# Patient Record
Sex: FEMALE | Race: WHITE | NOT HISPANIC OR LATINO | Employment: FULL TIME | URBAN - METROPOLITAN AREA
[De-identification: names, ages, dates, MRNs, and addresses within clinical notes are randomized per-mention and may not be internally consistent; named-entity substitution may affect disease eponyms.]

---
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
## 2023-10-25 ENCOUNTER — HOSPITAL ENCOUNTER (EMERGENCY)
Facility: CLINIC | Age: 23
Discharge: HOME | End: 2023-10-25
Payer: OTHER MISCELLANEOUS

## 2023-10-25 ENCOUNTER — APPOINTMENT (OUTPATIENT)
Dept: GENERAL RADIOLOGY | Facility: CLINIC | Age: 23
End: 2023-10-25
Payer: OTHER MISCELLANEOUS

## 2023-10-25 VITALS
RESPIRATION RATE: 16 BRPM | HEIGHT: 63 IN | OXYGEN SATURATION: 100 % | TEMPERATURE: 98.1 F | HEART RATE: 80 BPM | SYSTOLIC BLOOD PRESSURE: 127 MMHG | DIASTOLIC BLOOD PRESSURE: 83 MMHG

## 2023-10-25 DIAGNOSIS — S62.650A CLOSED NONDISPLACED FRACTURE OF MIDDLE PHALANX OF RIGHT INDEX FINGER, INITIAL ENCOUNTER: ICD-10-CM

## 2023-10-25 PROCEDURE — 26720 TREAT FINGER FRACTURE EACH: CPT | Mod: 54

## 2023-10-25 PROCEDURE — G0463 HOSPITAL OUTPT CLINIC VISIT: HCPCS | Mod: 25

## 2023-10-25 PROCEDURE — 26720 TREAT FINGER FRACTURE EACH: CPT | Mod: F6

## 2023-10-25 PROCEDURE — 99203 OFFICE O/P NEW LOW 30 MIN: CPT | Mod: 57

## 2023-10-25 PROCEDURE — 73140 X-RAY EXAM OF FINGER(S): CPT | Mod: RT

## 2023-10-25 NOTE — DISCHARGE INSTRUCTIONS
Finger splint for immobilization.  Tylenol and ibuprofen as needed for pain.  Ice for swelling.  Referral was placed for orthopedics.  Please follow-up with them in about a week.  Return in the meantime for any new concerns.

## 2023-10-25 NOTE — LETTER
October 25, 2023      To Whom It May Concern:      Trice Nguyen was seen in our urgent care today, 10/25/23.  She will be required to wear a finger splint.  Please provide her with appropriate accommodations considering this.  She may receive further recommendations after follow-up with orthopedics.    Sincerely,        JUNIOR Escudero CNP

## 2023-10-26 NOTE — ED PROVIDER NOTES
"  History   No chief complaint on file.    HPI  Trice Nguyen is a 23 year old female who presents to urgent care for concern of finger injury patient states that she was going to block a soccer ball with her hand and in the process manage to run into a cement wall causing her to jam her right index finger.  Injury occurred a few hours prior to arrival.  Patient reports decreased range of motion in the affected index finger.  Pain located mostly at the PIP joint and the finger.  Has tried ice and ibuprofen without any significant relief in her symptoms.  No pain in the hand or other fingers of the affected hand.  No additional concerns at this time.    Allergies:  Allergies   Allergen Reactions    Amoxicillin     Neosporin [Neomycin-Polymyxin-Gramicidin]        Problem List:    There are no problems to display for this patient.       Past Medical History:    No past medical history on file.    Past Surgical History:    No past surgical history on file.    Family History:    No family history on file.    Social History:  Marital Status:  Single [1]        Medications:    APRI 0.15-30 MG-MCG tablet  escitalopram (LEXAPRO) 10 MG tablet          Review of Systems   All other systems reviewed and are negative.    See HPI  Physical Exam   BP: 127/83  Pulse: 80  Temp: 98.1  F (36.7  C)  Resp: 16  Height: 160 cm (5' 3\")  SpO2: 100 %      Physical Exam  Constitutional:       General: She is not in acute distress.     Appearance: Normal appearance. She is well-developed.   HENT:      Head: Normocephalic and atraumatic.   Eyes:      General: No scleral icterus.     Conjunctiva/sclera: Conjunctivae normal.   Cardiovascular:      Rate and Rhythm: Normal rate.   Pulmonary:      Effort: Pulmonary effort is normal. No respiratory distress.   Abdominal:      General: Abdomen is flat.   Musculoskeletal:      Right wrist: Normal.      Right hand: Swelling (Mild swelling of the right second finger.) and bony tenderness (PIP joint " of the second finger) present. Decreased range of motion. Decreased strength of finger abduction and thumb/finger opposition. Normal sensation. Normal capillary refill. Normal pulse.      Cervical back: Normal range of motion and neck supple.   Skin:     General: Skin is warm and dry.      Findings: No rash.   Neurological:      Mental Status: She is alert and oriented to person, place, and time.         ED Course                 Procedures           Results for orders placed or performed during the hospital encounter of 10/25/23 (from the past 24 hour(s))   XR Finger Right G/E 2 Views    Narrative    EXAM: XR FINGER RIGHT G/E 2 VIEWS  LOCATION: [facility]  DATE: 10/25/2023    INDICATION: Finger pain after jamming injury.  COMPARISON: None.      Impression    IMPRESSION: There is a nondisplaced fracture of the volar aspect of the base of the middle phalanx of the finger seen on the lateral view suggestive of an avulsive injury to the tendinous and/or capsular attachments at the base of the middle phalanx. No   dislocation. Exam otherwise negative.         Medications - No data to display    Assessments & Plan (with Medical Decision Making)   Presented to urgent care for concern of finger injury.  She is afebrile on arrival vital signs otherwise reassuring.  X-rays were completed today of the affected finger which shows a nondisplaced fracture of the second finger at the base of the middle phalanx.  CMS remains intact and patient has normal capillary refill.  Finger was splinted in the department today.  Symptomatic pain relief discussed.  Return precautions reviewed.  Patient was discharged in good condition and is agreeable to above plan.  Referral for orthopedics was placed for follow-up in about 1 week.  Work note and work limitations provided.    I have reviewed the nursing notes.    I have reviewed the findings, diagnosis, plan and need for follow up with the  patient.        Discharge Medication List as of 10/25/2023  6:48 PM          Final diagnoses:   Closed nondisplaced fracture of middle phalanx of right index finger, initial encounter       10/25/2023   [facility] EMERGENCY DEPT      Disclaimer:  This note consists of symbols derived from keyboarding, dictation and/or voice recognition software.  As a result, there may be errors in the script that have gone undetected.  Please consider this when interpreting information found in this chart.       Abdiel Valerio APRN CNP  10/25/23 2031

## 2023-11-08 ENCOUNTER — ANCILLARY PROCEDURE (OUTPATIENT)
Dept: GENERAL RADIOLOGY | Facility: CLINIC | Age: 23
End: 2023-11-08
Attending: PEDIATRICS
Payer: COMMERCIAL

## 2023-11-08 ENCOUNTER — OFFICE VISIT (OUTPATIENT)
Dept: ORTHOPEDICS | Facility: CLINIC | Age: 23
End: 2023-11-08
Payer: OTHER MISCELLANEOUS

## 2023-11-08 VITALS — HEIGHT: 63 IN | BODY MASS INDEX: 28.53 KG/M2 | WEIGHT: 161 LBS

## 2023-11-08 DIAGNOSIS — S63.639A VOLAR PLATE INJURY OF FINGER, INITIAL ENCOUNTER: ICD-10-CM

## 2023-11-08 PROBLEM — S62.650A CLOSED NONDISPLACED FRACTURE OF MIDDLE PHALANX OF RIGHT INDEX FINGER, INITIAL ENCOUNTER: Status: ACTIVE | Noted: 2023-11-08

## 2023-11-08 PROCEDURE — 73140 X-RAY EXAM OF FINGER(S): CPT | Mod: TC | Performed by: RADIOLOGY

## 2023-11-08 PROCEDURE — 99204 OFFICE O/P NEW MOD 45 MIN: CPT | Performed by: PEDIATRICS

## 2023-11-08 NOTE — LETTER
11/8/2023         RE: Trice Nguyen  [address] [place] [address]        Dear Colleague,    Thank you for referring your patient, Trice Nguyen, to the [facility] [place]. Please see a copy of my visit note below.    ASSESSMENT & PLAN    Trice was seen today for fracture.    Diagnoses and all orders for this visit:    Volar plate injury of finger, initial encounter  -     Orthopedic  Referral  -     Cancel: XR Finger Right G/E 2 Views; Future  -     XR Finger Right G/E 2 Views      This issue is acute and Unchanged.      ICD-10-CM    1. Volar plate injury of finger, initial encounter  S63.639A Orthopedic  Referral     XR Finger Right G/E 2 Views     CANCELED: XR Finger Right G/E 2 Views        Patient Instructions   We discussed these other possible diagnosis: Volar plate fracture, can transition to buddy taping    Plan:  - Today's Plan of Care:  Transition to buddy taping for support  Work Letter given    Discussed activity considerations and other supportive care including Ice/Heat (water), OTC medications as needed.    After 1- 2 weeks, start to remove taping and work on range of motion    -We also discussed other future treatment options:  Referral to Hand Occupational Therapy    Follow Up: 3 weeks, repeat x-rays    Concerning signs and symptoms were reviewed and all questions were answered at this time.    Lacey Alicea MD [facility]  Sports Medicine Physician  [facility]    -----  Chief Complaint   Patient presents with     Right Index Finger - Fracture       SUBJECTIVE  Trice Nguyen is a/an 23 year old female who is seen in consultation at the request of  Abdiel Valerio C.N.P. for evaluation of R middle finger.     The patient is seen by themselves.  The patient is Right handed    Onset: 2 week(s) ago. 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  CV: distal perfusion intact  Resp: normal respiratory effort without conversational dyspnea   Psych: normal mood and affect  Gait: NORMAL  Neuro: Normal light sensory exam of upper extremity    Bilateral Wrist and Hand exam    Inspection:       Swelling: throughout left index finger    Tender:       MCP joint of index finger digit(s)  left    Non Tender:       Remainder of the Wrist and Hand bilateral    ROM:       Decreased ROM left index finger MCP joint, able to flex and extend at every joint    Strength:       Decreased strength left MCP joint, otherwise 5/5 strength in the muscles of the hand, wrist and forearm bilateral    Neurovascular:       2+ radial pulses bilaterally with brisk capillary refill and      normal sensation to light touch in the radial, median and ulnar nerve distributions    RADIOLOGY:  Final results and radiologist's interpretation, available in the [facility] health record.  Images were reviewed with the patient in the office today.  My personal interpretation of the performed imaging:   3 XR views of left index finger reviewed and compared to ER XR 10/25/2023: volar plate chip fracture, no significant degenerative change  - will follow official read    Reviewed ED note  Review of the result(s) of each unique test - XR             Again, thank you for " allowing me to participate in the care of your patient.        Sincerely,        Lacey Alicea MD

## 2023-11-08 NOTE — PATIENT INSTRUCTIONS
We discussed these other possible diagnosis: Volar plate fracture, can transition to buddy taping    Plan:  - Today's Plan of Care:  Transition to buddy taping for support  Work Letter given    Discussed activity considerations and other supportive care including Ice/Heat (water), OTC medications as needed.    After 1- 2 weeks, start to remove taping and work on range of motion    -We also discussed other future treatment options:  Referral to Hand Occupational Therapy    Follow Up: 3 weeks, repeat x-rays    If you have any further questions for your physician or physician s care team you can call [phone] and use option 3 to leave a voice message.

## 2023-11-08 NOTE — PROGRESS NOTES
ASSESSMENT & PLAN    Trice was seen today for fracture.    Diagnoses and all orders for this visit:    Volar plate injury of finger, initial encounter  -     Orthopedic  Referral  -     Cancel: XR Finger Right G/E 2 Views; Future  -     XR Finger Right G/E 2 Views      This issue is acute and Unchanged.      ICD-10-CM    1. Volar plate injury of finger, initial encounter  S63.639A Orthopedic  Referral     XR Finger Right G/E 2 Views     CANCELED: XR Finger Right G/E 2 Views        Patient Instructions   We discussed these other possible diagnosis: Volar plate fracture, can transition to buddy taping    Plan:  - Today's Plan of Care:  Transition to buddy taping for support  Work Letter given    Discussed activity considerations and other supportive care including Ice/Heat (water), OTC medications as needed.    After 1- 2 weeks, start to remove taping and work on range of motion    -We also discussed other future treatment options:  Referral to Hand Occupational Therapy    Follow Up: 3 weeks, repeat x-rays    Concerning signs and symptoms were reviewed and all questions were answered at this time.    Lacey Alicea MD [facility]  Sports Medicine Physician  [facility]    -----  Chief Complaint   Patient presents with    Right Index Finger - Fracture       SUBJECTIVE  Trice Nguyen is a/an 23 year old female who is seen in consultation at the request of  Abdiel Valerio C.N.P. for evaluation of R middle finger.     The patient is seen by themselves.  The patient is Right handed    Onset: 2 week(s) ago. 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  CV: distal perfusion intact  Resp: normal respiratory effort without conversational dyspnea   Psych: normal mood and affect  Gait: NORMAL  Neuro: Normal light sensory exam of upper extremity    Bilateral Wrist and Hand exam    Inspection:       Swelling: throughout left index finger    Tender:       MCP joint of index finger digit(s)  left    Non Tender:       Remainder of the Wrist and Hand bilateral    ROM:       Decreased ROM left index finger MCP joint, able to flex and extend at every joint    Strength:       Decreased strength left MCP joint, otherwise 5/5 strength in the muscles of the hand, wrist and forearm bilateral    Neurovascular:       2+ radial pulses bilaterally with brisk capillary refill and      normal sensation to light touch in the radial, median and ulnar nerve distributions    RADIOLOGY:  Final results and radiologist's interpretation, available in the [facility] health record.  Images were reviewed with the patient in the office today.  My personal interpretation of the performed imaging:   3 XR views of left index finger reviewed and compared to ER XR 10/25/2023: volar plate chip fracture, no significant degenerative change  - will follow official read    Reviewed ED note  Review of the result(s) of each unique test - XR         "

## 2023-11-08 NOTE — LETTER
November 8, 2023      Trice Coughlin  [address] [place] [address]        To Whom It May Concern,     Trice was seen for a right finger fracture.  She can work with the following restrictions:  - devonte tape for support  - provide accommodations for limited use of the right hand  - Limit lifting to < 20 lbs    Follow up will be in 3 weeks    Sincerely,        Lacey Alicea MD

## 2023-11-29 ENCOUNTER — ANCILLARY PROCEDURE (OUTPATIENT)
Dept: GENERAL RADIOLOGY | Facility: CLINIC | Age: 23
End: 2023-11-29
Attending: PEDIATRICS
Payer: COMMERCIAL

## 2023-11-29 ENCOUNTER — OFFICE VISIT (OUTPATIENT)
Dept: ORTHOPEDICS | Facility: CLINIC | Age: 23
End: 2023-11-29
Payer: OTHER MISCELLANEOUS

## 2023-11-29 VITALS — WEIGHT: 161 LBS | BODY MASS INDEX: 28.52 KG/M2

## 2023-11-29 DIAGNOSIS — S63.639D VOLAR PLATE INJURY OF FINGER, SUBSEQUENT ENCOUNTER: Primary | ICD-10-CM

## 2023-11-29 DIAGNOSIS — S63.639D VOLAR PLATE INJURY OF FINGER, SUBSEQUENT ENCOUNTER: ICD-10-CM

## 2023-11-29 PROCEDURE — 73140 X-RAY EXAM OF FINGER(S): CPT | Mod: TC | Performed by: RADIOLOGY

## 2023-11-29 PROCEDURE — 99213 OFFICE O/P EST LOW 20 MIN: CPT | Performed by: PEDIATRICS

## 2023-11-29 NOTE — PATIENT INSTRUCTIONS
We discussed these other possible diagnosis: Healing fracture    Plan:  - Today's Plan of Care:  Buddy tape with support as needed - heavy activities, lifting, sports  Gradual return to activities as needed    Work Letter given    -We also discussed other future treatment options:  Referral to Occupational Hand Therapy    Follow Up: 1 month if needed    If you have any further questions for your physician or physician s care team you can call [phone] and use option 3 to leave a voice message.

## 2023-11-29 NOTE — LETTER
11/29/2023         RE: Trice Nguyen  [address] [place] [address]        Dear Colleague,    Thank you for referring your patient, Trice Nguyen, to the [facility] [place]. Please see a copy of my visit note below.    ASSESSMENT & PLAN    Diagnoses and all orders for this visit:    Volar plate injury of finger, subsequent encounter  -     XR Finger Right G/E 2 Views; Future      This issue is acute and Improving.      ICD-10-CM    1. Volar plate injury of finger, subsequent encounter  S63.639D XR Finger Right G/E 2 Views        Patient Instructions   We discussed these other possible diagnosis: Healing fracture    Plan:  - Today's Plan of Care:  Buddy tape with support as needed - heavy activities, lifting, sports  Gradual return to activities as needed    Work Letter given    -We also discussed other future treatment options:  Referral to Occupational Hand Therapy    Follow Up: 1 month if needed    Concerning signs and symptoms were reviewed and all questions were answered at this time.    Lacey Alicea MD [facility]  Sports Medicine Physician  [facility]    SUBJECTIVE- Interim History November 29, 2023    No chief complaint on file.      Trice Nguyen is a 23 year old female who is seen in f/u up for Volar plate injury of finger, subsequent encounter. Since last visit on 11/8/23 , patient has been feeling pretty good. She is able to make a fist.  She states twisting sometimes hurts, but feels mostly normal. Has been buddy taping at work for support.  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  CV: distal perfusion intact  Resp: normal respiratory effort without conversational dyspnea   Psych: normal mood and affect  Gait: NORMAL  Neuro: Normal light sensory exam of upper extremity    Bilateral Wrist and Hand exam  Inspection:       No swelling, bruising or deformity bilateral    Tender:       none    Non Tender:       Remainder of the Wrist and Hand bilateral    ROM:       Full and symmetric active and passive range of motion of the forearm, wrist and digits bilateral    Strength:       5/5 strength in the muscles of the hand, wrist and forearm bilateral    Neurovascular:       2+ radial pulses bilaterally with brisk capillary refill and      normal sensation to light touch in the radial, median and ulnar nerve distributions    RADIOLOGY:  Final results and radiologist's interpretation, available in the [facility] health record.  Images were reviewed with the patient in the office today.  My personal interpretation of the performed imaging:     3 XR views of right index finger reviewed: healing volar plate fracture, no significant degenerative change  - will follow official read    Review of the result(s) of each unique test - XR             Again, thank you for allowing me to participate in the care of your patient.        Sincerely,        Lacey Alicea MD

## 2023-11-29 NOTE — PROGRESS NOTES
ASSESSMENT & PLAN    Diagnoses and all orders for this visit:    Volar plate injury of finger, subsequent encounter  -     XR Finger Right G/E 2 Views; Future      This issue is acute and Improving.      ICD-10-CM    1. Volar plate injury of finger, subsequent encounter  S63.639D XR Finger Right G/E 2 Views        Patient Instructions   We discussed these other possible diagnosis: Healing fracture    Plan:  - Today's Plan of Care:  Buddy tape with support as needed - heavy activities, lifting, sports  Gradual return to activities as needed    Work Letter given    -We also discussed other future treatment options:  Referral to Occupational Hand Therapy    Follow Up: 1 month if needed    Concerning signs and symptoms were reviewed and all questions were answered at this time.    Lacey Alicea MD [facility]  Sports Medicine Physician  [facility]    SUBJECTIVE- Interim History November 29, 2023    No chief complaint on file.      Trice Nguyen is a 23 year old female who is seen in f/u up for Volar plate injury of finger, subsequent encounter. Since last visit on 11/8/23 , patient has been feeling pretty good. She is able to make a fist.  She states twisting sometimes hurts, but feels mostly normal. Has been buddy taping at work for support.  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  CV: distal perfusion intact  Resp: normal respiratory effort without conversational dyspnea   Psych: normal mood and affect  Gait: NORMAL  Neuro: Normal light sensory exam of upper extremity    Bilateral Wrist and Hand exam  Inspection:       No swelling, bruising or deformity bilateral    Tender:       none    Non Tender:       Remainder of the Wrist and Hand bilateral    ROM:       Full and symmetric active and passive range of motion of the forearm, wrist and digits bilateral    Strength:       5/5 strength in the muscles of the hand, wrist and forearm bilateral    Neurovascular:       2+ radial pulses bilaterally with brisk capillary refill and      normal sensation to light touch in the radial, median and ulnar nerve distributions    RADIOLOGY:  Final results and radiologist's interpretation, available in the [facility] health record.  Images were reviewed with the patient in the office today.  My personal interpretation of the performed imaging:     3 XR views of right index finger reviewed: healing volar plate fracture, no significant degenerative change  - will follow official read    Review of the result(s) of each unique test - XR

## 2023-11-29 NOTE — LETTER
November 29, 2023      Trice Coughlin  [address] [place] [address]        To Whom It May Concern,      Trice was seen for a right finger fracture.  She can work with the following restrictions for the next month  - buddy tape for support, as needed    Then can return without restrictions.    Sincerely,           Lacey Alicea MD